# Patient Record
Sex: FEMALE | Race: WHITE
[De-identification: names, ages, dates, MRNs, and addresses within clinical notes are randomized per-mention and may not be internally consistent; named-entity substitution may affect disease eponyms.]

---

## 2021-04-05 ENCOUNTER — HOSPITAL ENCOUNTER (EMERGENCY)
Dept: HOSPITAL 47 - EC | Age: 56
Discharge: HOME | End: 2021-04-05
Payer: COMMERCIAL

## 2021-04-05 VITALS
SYSTOLIC BLOOD PRESSURE: 110 MMHG | DIASTOLIC BLOOD PRESSURE: 79 MMHG | HEART RATE: 107 BPM | TEMPERATURE: 101.7 F | RESPIRATION RATE: 20 BRPM

## 2021-04-05 DIAGNOSIS — U07.1: Primary | ICD-10-CM

## 2021-04-05 PROCEDURE — 71046 X-RAY EXAM CHEST 2 VIEWS: CPT

## 2021-04-05 PROCEDURE — 99284 EMERGENCY DEPT VISIT MOD MDM: CPT

## 2021-04-05 NOTE — ED
General Adult HPI





- General


Stated complaint: Covid+ - Sent by PCP





- History of Present Illness


Initial comments: 


56-year-old female resents to the emergency department with a chief complaint of

cough, congestion and myalgias.  Patient reports she was diagnosed with Covid on

3/26/21.  States she has been having symptoms starting the next day.  Patient 

reports a nonproductive cough and congestion.  She does report occasional 

dyspnea especially after having a coughing fit.  She does report chills and 

fevers.  Denies any chest pain.  Reports taking Tylenol and Motrin.  Denies any 

abdominal or back pain.  Denies any nausea vomiting diarrhea.





- Related Data


                                    Allergies











Allergy/AdvReac Type Severity Reaction Status Date / Time


 


Sulfa (Sulfonamide Allergy  Rash/Hives Verified 04/05/21 17:27





Antibiotics)     














Review of Systems


ROS Statement: 


Those systems with pertinent positive or pertinent negative responses have been 

documented in the HPI.





ROS Other: All systems not noted in ROS Statement are negative.





General Exam


Limitations: no limitations


General appearance: alert, in no apparent distress


Head exam: Present: atraumatic, normocephalic, normal inspection


Eye exam: Present: normal appearance, PERRL, EOMI


Pupils: Present: normal accommodation


ENT exam: Present: normal exam, normal oropharynx, mucous membranes moist, TM's 

normal bilaterally, normal external ear exam


Neck exam: Present: normal inspection, full ROM.  Absent: tenderness


Respiratory exam: Present: normal lung sounds bilaterally.  Absent: respiratory 

distress, wheezes, rales, rhonchi, stridor, chest wall tenderness, accessory 

muscle use


Cardiovascular Exam: Present: regular rate, normal rhythm, normal heart sounds. 

Absent: bradycardia


Extremities exam: Present: normal inspection, full ROM, normal capillary refill.

 Absent: tenderness, pedal edema, joint swelling


Back exam: Present: normal inspection, full ROM.  Absent: tenderness, CVA 

tenderness (R), CVA tenderness (L)


Neurological exam: Present: alert, oriented X3, normal gait


Psychiatric exam: Present: normal affect, normal mood


Skin exam: Present: warm, dry, intact, normal color





Course


                                   Vital Signs











  04/05/21





  17:22


 


Temperature 101.7 F H


 


Pulse Rate 107 H


 


Respiratory 20





Rate 


 


Blood Pressure 110/79


 


O2 Sat by Pulse 97





Oximetry 














Medical Decision Making





- Medical Decision Making





56-year-old female presents to emergency department with chief complaint of 

cough and congestion.  On physical examination, patient is resting comfortably 

and does not appear to be in any respiratory distress.  She is febrile on 

arrival and slightly tachycardic but not hypoxic.  Chest x-ray reveals a mild 

diffuse opacity which could represent atelectasis or developing infiltrates.  

Patient is positive for Covid.  She does not fit criteria for monoclonal 

antibody.  Advised her to continue with the Tylenol Motrin home.  She was given 

Tylenol here.  No chest pain or shortness of breath.  Advised to return to the 

emergency department if symptoms worsen.  Advised to quarantine.  Case discussed

with Dr. Beckham.





Disposition


Clinical Impression: 


 COVID-19





Disposition: HOME SELF-CARE


Condition: Stable


Instructions (If sedation given, give patient instructions):  Coronavirus 

Disease 2019 (COVID-19)


Additional Instructions: 


Please return to the Emergency Department if symptoms worsen or any other 

concerns.  Quarantine for 10 days since you tested positive.  Take Tylenol 

Motrin for fever.


Is patient prescribed a controlled substance at d/c from ED?: No


Referrals: 


Norma Prather MD [Primary Care Provider] - 1-2 days


Time of Disposition: 19:21

## 2021-04-05 NOTE — XR
EXAMINATION TYPE: XR chest 2V

 

DATE OF EXAM: 4/5/2021

 

COMPARISON: NONE

 

HISTORY: Cough and fever.

 

TECHNIQUE:  Frontal and lateral views of the chest are obtained.

 

FINDINGS:  There is diffuse mild hazy opacity. No pleural effusion, or pneumothorax seen.  The cardia
c silhouette size is within normal limits.   The osseous structures are intact.

 

IMPRESSION:  Mild opacity may represent atelectasis or developing infiltrates.